# Patient Record
Sex: MALE | Race: WHITE | Employment: OTHER | URBAN - METROPOLITAN AREA
[De-identification: names, ages, dates, MRNs, and addresses within clinical notes are randomized per-mention and may not be internally consistent; named-entity substitution may affect disease eponyms.]

---

## 2022-11-08 ENCOUNTER — HOSPITAL ENCOUNTER (OUTPATIENT)
Age: 67
Setting detail: OUTPATIENT SURGERY
Discharge: HOME | End: 2022-11-08
Attending: OPHTHALMOLOGY | Admitting: OPHTHALMOLOGY
Payer: MEDICARE

## 2022-11-08 ENCOUNTER — ANESTHESIA (OUTPATIENT)
Dept: SURGERY | Age: 67
End: 2022-11-08
Payer: MEDICARE

## 2022-11-08 ENCOUNTER — PREP FOR PROCEDURE (OUTPATIENT)
Dept: ADMINISTRATIVE | Age: 67
End: 2022-11-08

## 2022-11-08 ENCOUNTER — ANESTHESIA EVENT (OUTPATIENT)
Dept: SURGERY | Age: 67
End: 2022-11-08
Payer: MEDICARE

## 2022-11-08 VITALS
TEMPERATURE: 97.2 F | RESPIRATION RATE: 18 BRPM | DIASTOLIC BLOOD PRESSURE: 59 MMHG | HEART RATE: 66 BPM | SYSTOLIC BLOOD PRESSURE: 109 MMHG | HEIGHT: 71 IN | OXYGEN SATURATION: 100 % | WEIGHT: 194 LBS | BODY MASS INDEX: 27.16 KG/M2

## 2022-11-08 PROBLEM — I48.91 A-FIB (HCC): Status: ACTIVE | Noted: 2022-11-08

## 2022-11-08 PROBLEM — N18.30 CKD (CHRONIC KIDNEY DISEASE), STAGE III (HCC): Status: ACTIVE | Noted: 2022-11-08

## 2022-11-08 PROBLEM — I10 HTN (HYPERTENSION): Status: ACTIVE | Noted: 2022-11-08

## 2022-11-08 PROBLEM — E11.9 DIABETES (HCC): Status: ACTIVE | Noted: 2022-11-08

## 2022-11-08 PROBLEM — I50.9 CHF (CONGESTIVE HEART FAILURE) (HCC): Status: ACTIVE | Noted: 2022-11-08

## 2022-11-08 LAB
GLUCOSE BLD STRIP.AUTO-MCNC: 100 MG/DL (ref 65–100)
POTASSIUM BLD-SCNC: 3.8 MMOL/L (ref 3.5–5.1)
SERVICE CMNT-IMP: NORMAL

## 2022-11-08 PROCEDURE — 2580000003 HC RX 258

## 2022-11-08 PROCEDURE — 6360000002 HC RX W HCPCS: Performed by: OPHTHALMOLOGY

## 2022-11-08 PROCEDURE — 7100000000 HC PACU RECOVERY - FIRST 15 MIN: Performed by: OPHTHALMOLOGY

## 2022-11-08 PROCEDURE — 7100000001 HC PACU RECOVERY - ADDTL 15 MIN: Performed by: OPHTHALMOLOGY

## 2022-11-08 PROCEDURE — 3600000014 HC SURGERY LEVEL 4 ADDTL 15MIN: Performed by: OPHTHALMOLOGY

## 2022-11-08 PROCEDURE — 3600000004 HC SURGERY LEVEL 4 BASE: Performed by: OPHTHALMOLOGY

## 2022-11-08 PROCEDURE — 2500000003 HC RX 250 WO HCPCS: Performed by: OPHTHALMOLOGY

## 2022-11-08 PROCEDURE — 2709999900 HC NON-CHARGEABLE SUPPLY: Performed by: OPHTHALMOLOGY

## 2022-11-08 PROCEDURE — 6370000000 HC RX 637 (ALT 250 FOR IP): Performed by: OPHTHALMOLOGY

## 2022-11-08 PROCEDURE — 82962 GLUCOSE BLOOD TEST: CPT

## 2022-11-08 PROCEDURE — 2720000010 HC SURG SUPPLY STERILE: Performed by: OPHTHALMOLOGY

## 2022-11-08 PROCEDURE — 6360000002 HC RX W HCPCS

## 2022-11-08 PROCEDURE — 7100000010 HC PHASE II RECOVERY - FIRST 15 MIN: Performed by: OPHTHALMOLOGY

## 2022-11-08 PROCEDURE — 3700000000 HC ANESTHESIA ATTENDED CARE: Performed by: OPHTHALMOLOGY

## 2022-11-08 PROCEDURE — 84132 ASSAY OF SERUM POTASSIUM: CPT

## 2022-11-08 PROCEDURE — 3700000001 HC ADD 15 MINUTES (ANESTHESIA): Performed by: OPHTHALMOLOGY

## 2022-11-08 RX ORDER — TRAMADOL HYDROCHLORIDE 50 MG/1
50 TABLET ORAL 3 TIMES DAILY
COMMUNITY

## 2022-11-08 RX ORDER — CYCLOPENTOLATE HYDROCHLORIDE 20 MG/ML
1 SOLUTION/ DROPS OPHTHALMIC
Status: CANCELLED | OUTPATIENT
Start: 2022-11-08 | End: 2022-11-08

## 2022-11-08 RX ORDER — GABAPENTIN 400 MG/1
400 CAPSULE ORAL 3 TIMES DAILY
COMMUNITY
Start: 2022-10-31 | End: 2023-04-29

## 2022-11-08 RX ORDER — BUPIVACAINE HYDROCHLORIDE 7.5 MG/ML
INJECTION, SOLUTION EPIDURAL; RETROBULBAR PRN
Status: DISCONTINUED | OUTPATIENT
Start: 2022-11-08 | End: 2022-11-08 | Stop reason: HOSPADM

## 2022-11-08 RX ORDER — DULAGLUTIDE 0.75 MG/.5ML
0.75 INJECTION, SOLUTION SUBCUTANEOUS WEEKLY
COMMUNITY
Start: 2022-10-08

## 2022-11-08 RX ORDER — BETAMETHASONE SODIUM PHOSPHATE AND BETAMETHASONE ACETATE 3; 3 MG/ML; MG/ML
INJECTION, SUSPENSION INTRA-ARTICULAR; INTRALESIONAL; INTRAMUSCULAR; SOFT TISSUE PRN
Status: DISCONTINUED | OUTPATIENT
Start: 2022-11-08 | End: 2022-11-08 | Stop reason: HOSPADM

## 2022-11-08 RX ORDER — CYCLOPENTOLATE HYDROCHLORIDE 20 MG/ML
1 SOLUTION/ DROPS OPHTHALMIC
Status: COMPLETED | OUTPATIENT
Start: 2022-11-08 | End: 2022-11-08

## 2022-11-08 RX ORDER — TROPICAMIDE 10 MG/ML
1 SOLUTION/ DROPS OPHTHALMIC
Status: COMPLETED | OUTPATIENT
Start: 2022-11-08 | End: 2022-11-08

## 2022-11-08 RX ORDER — TORSEMIDE 20 MG/1
20 TABLET ORAL DAILY
COMMUNITY
Start: 2022-05-27

## 2022-11-08 RX ORDER — SODIUM CHLORIDE, POTASSIUM CHLORIDE, CALCIUM CHLORIDE, MAGNESIUM CHLORIDE, SODIUM ACETATE, AND SODIUM CITRATE 6.4; .75; .48; .3; 3.9; 1.7 MG/ML; MG/ML; MG/ML; MG/ML; MG/ML; MG/ML
SOLUTION IRRIGATION PRN
Status: DISCONTINUED | OUTPATIENT
Start: 2022-11-08 | End: 2022-11-08 | Stop reason: HOSPADM

## 2022-11-08 RX ORDER — SPIRONOLACTONE 25 MG/1
25 TABLET ORAL DAILY
COMMUNITY
Start: 2022-08-29 | End: 2023-08-29

## 2022-11-08 RX ORDER — ACETAMINOPHEN 325 MG/1
650 TABLET ORAL EVERY 6 HOURS PRN
COMMUNITY
Start: 2022-10-08

## 2022-11-08 RX ORDER — PHENYLEPHRINE HCL 2.5 %
1 DROPS OPHTHALMIC (EYE)
Status: CANCELLED | OUTPATIENT
Start: 2022-11-08 | End: 2022-11-08

## 2022-11-08 RX ORDER — ASPIRIN 81 MG/1
81 TABLET ORAL DAILY
COMMUNITY
Start: 2022-04-26 | End: 2023-10-10

## 2022-11-08 RX ORDER — PREDNISOLONE ACETATE 10 MG/ML
SUSPENSION/ DROPS OPHTHALMIC
COMMUNITY
Start: 2022-11-08

## 2022-11-08 RX ORDER — SODIUM CHLORIDE, SODIUM LACTATE, POTASSIUM CHLORIDE, CALCIUM CHLORIDE 600; 310; 30; 20 MG/100ML; MG/100ML; MG/100ML; MG/100ML
INJECTION, SOLUTION INTRAVENOUS CONTINUOUS PRN
Status: DISCONTINUED | OUTPATIENT
Start: 2022-11-08 | End: 2022-11-08 | Stop reason: SDUPTHER

## 2022-11-08 RX ORDER — ESCITALOPRAM OXALATE 10 MG/1
10 TABLET ORAL DAILY
COMMUNITY

## 2022-11-08 RX ORDER — CEFAZOLIN SODIUM 1 G/3ML
INJECTION, POWDER, FOR SOLUTION INTRAMUSCULAR; INTRAVENOUS PRN
Status: DISCONTINUED | OUTPATIENT
Start: 2022-11-08 | End: 2022-11-08 | Stop reason: HOSPADM

## 2022-11-08 RX ORDER — TETRACAINE HYDROCHLORIDE 5 MG/ML
SOLUTION OPHTHALMIC PRN
Status: DISCONTINUED | OUTPATIENT
Start: 2022-11-08 | End: 2022-11-08 | Stop reason: HOSPADM

## 2022-11-08 RX ORDER — CARISOPRODOL 350 MG/1
350 TABLET ORAL
COMMUNITY

## 2022-11-08 RX ORDER — TROPICAMIDE 10 MG/ML
1 SOLUTION/ DROPS OPHTHALMIC
Status: CANCELLED | OUTPATIENT
Start: 2022-11-08 | End: 2022-11-08

## 2022-11-08 RX ORDER — TRAZODONE HYDROCHLORIDE 100 MG/1
100 TABLET ORAL
COMMUNITY

## 2022-11-08 RX ORDER — MIDAZOLAM HYDROCHLORIDE 1 MG/ML
INJECTION INTRAMUSCULAR; INTRAVENOUS PRN
Status: DISCONTINUED | OUTPATIENT
Start: 2022-11-08 | End: 2022-11-08 | Stop reason: SDUPTHER

## 2022-11-08 RX ORDER — NEOMYCIN SULFATE, POLYMYXIN B SULFATE, AND DEXAMETHASONE 3.5; 10000; 1 MG/G; [USP'U]/G; MG/G
OINTMENT OPHTHALMIC PRN
Status: DISCONTINUED | OUTPATIENT
Start: 2022-11-08 | End: 2022-11-08 | Stop reason: HOSPADM

## 2022-11-08 RX ORDER — METOPROLOL SUCCINATE 100 MG/1
100 TABLET, EXTENDED RELEASE ORAL NIGHTLY
COMMUNITY
Start: 2022-05-27 | End: 2023-05-22

## 2022-11-08 RX ORDER — ATORVASTATIN CALCIUM 40 MG/1
40 TABLET, FILM COATED ORAL DAILY
COMMUNITY
Start: 2022-05-27 | End: 2023-05-22

## 2022-11-08 RX ORDER — OFLOXACIN 3 MG/ML
SOLUTION/ DROPS OPHTHALMIC
COMMUNITY
Start: 2022-11-08

## 2022-11-08 RX ORDER — AMIODARONE HYDROCHLORIDE 200 MG/1
200 TABLET ORAL DAILY
COMMUNITY
Start: 2022-10-28

## 2022-11-08 RX ORDER — LIDOCAINE HYDROCHLORIDE 20 MG/ML
INJECTION, SOLUTION EPIDURAL; INFILTRATION; INTRACAUDAL; PERINEURAL PRN
Status: DISCONTINUED | OUTPATIENT
Start: 2022-11-08 | End: 2022-11-08 | Stop reason: HOSPADM

## 2022-11-08 RX ORDER — PHENYLEPHRINE HCL 2.5 %
1 DROPS OPHTHALMIC (EYE)
Status: COMPLETED | OUTPATIENT
Start: 2022-11-08 | End: 2022-11-08

## 2022-11-08 RX ADMIN — TROPICAMIDE 1 DROP: 10 SOLUTION/ DROPS OPHTHALMIC at 12:25

## 2022-11-08 RX ADMIN — TROPICAMIDE 1 DROP: 10 SOLUTION/ DROPS OPHTHALMIC at 12:35

## 2022-11-08 RX ADMIN — PHENYLEPHRINE HYDROCHLORIDE 1 DROP: 25 SOLUTION/ DROPS OPHTHALMIC at 12:35

## 2022-11-08 RX ADMIN — TROPICAMIDE 1 DROP: 10 SOLUTION/ DROPS OPHTHALMIC at 12:30

## 2022-11-08 RX ADMIN — CYCLOPENTOLATE HYDROCHLORIDE 1 DROP: 20 SOLUTION/ DROPS OPHTHALMIC at 12:30

## 2022-11-08 RX ADMIN — PHENYLEPHRINE HYDROCHLORIDE 1 DROP: 25 SOLUTION/ DROPS OPHTHALMIC at 12:25

## 2022-11-08 RX ADMIN — MIDAZOLAM 1 MG: 1 INJECTION INTRAMUSCULAR; INTRAVENOUS at 14:12

## 2022-11-08 RX ADMIN — SODIUM CHLORIDE, SODIUM LACTATE, POTASSIUM CHLORIDE, AND CALCIUM CHLORIDE: 600; 310; 30; 20 INJECTION, SOLUTION INTRAVENOUS at 14:00

## 2022-11-08 RX ADMIN — CYCLOPENTOLATE HYDROCHLORIDE 1 DROP: 20 SOLUTION/ DROPS OPHTHALMIC at 12:25

## 2022-11-08 RX ADMIN — CYCLOPENTOLATE HYDROCHLORIDE 1 DROP: 20 SOLUTION/ DROPS OPHTHALMIC at 12:35

## 2022-11-08 RX ADMIN — MIDAZOLAM 1 MG: 1 INJECTION INTRAMUSCULAR; INTRAVENOUS at 14:07

## 2022-11-08 RX ADMIN — FENTANYL CITRATE 50 MCG: 50 INJECTION INTRAMUSCULAR; INTRAVENOUS at 14:08

## 2022-11-08 RX ADMIN — PHENYLEPHRINE HYDROCHLORIDE 1 DROP: 25 SOLUTION/ DROPS OPHTHALMIC at 12:30

## 2022-11-08 NOTE — DISCHARGE INSTRUCTIONS
FACE DOWN WHILE AWAKE X 24 HOURS, DO NOT LAY ON YOUR BACK. AFTER 24 HOURS, LEFT-SIDE DOWN FOR 2 DAYS. ACTIVITY   As tolerated and as directed by your doctor. You may shower in 24 hours. Do not take a bath until cleared by MD.     DIET   Clear liquids until no nausea or vomiting, then light diet for the first day. Advance to regular diet on second day, unless your doctor orders otherwise. If nausea and vomiting continues, call your doctor. PAIN   Take pain medication as directed by your doctor. Call your doctor if pain is NOT relieved by medication. CALL YOUR DOCTOR IF   Excessive bleeding that does not stop after holding pressure over the area. Temperature of 101 degrees F or above. Excessive redness, swelling or bruising, and/or green or yellow, smelly discharge from incision. After general anesthesia or intravenous sedation, for 24 hours or while taking prescription Narcotics:   Limit your activities   A responsible adult needs to be with you for the next 24 hours   Do not drive and operate hazardous machinery   Do not make important personal or business decisions   Do not drink alcoholic beverages     If you have not urinated within 8 hours after discharge, and you are experiencing discomfort from urinary retention, please go to the nearest ED. If you have sleep apnea and have a CPAP machine, please use it for all naps and sleeping. Please use caution when taking narcotics and any of your home medications that may cause drowsiness. * Please give a list of your current medications to your Primary Care Provider. * Please update this list whenever your medications are discontinued, doses are   changed, or new medications (including over-the-counter products) are added. * Please carry medication information at all times in case of emergency situations.      These are general instructions for a healthy lifestyle:   No smoking/ No tobacco products/ Avoid exposure to second hand smoke   Surgeon General's Warning: Quitting smoking now greatly reduces serious risk to your health. Obesity, smoking, and sedentary lifestyle greatly increases your risk for illness   A healthy diet, regular physical exercise & weight monitoring are important for maintaining a healthy lifestyle   You may be retaining fluid if you have a history of heart failure or if you experience any of the following symptoms: Weight gain of 3 pounds or more overnight or 5 pounds in a week, increased swelling in our hands or feet or shortness of breath while lying flat in bed. Please call your doctor as soon as you notice any of these symptoms; do not wait until your next office visit.

## 2022-11-08 NOTE — OP NOTE
FULL OPERATIVE NOTE         Date Time: 11/08/22 @ 3:20 PM     Patient Name: Alex Kemp     Attending Physician: Mandeep Diaz MD     Date of Operation:     11/08/22     Providers Performing:     Surgeon: Mandeep Diaz MD     Assistant: N/A     Operative Procedure:     Procedure(s):  EYE VITRECTOMY 25 GAUGE/ LASER/ GAS FLUID EXCHANGE/RIGHT     Preoperative Diagnosis:     Rhegmatogenous retinal detachment with multiple break(s), right eye       Postoperative Diagnosis:     Rhegmatogenous retinal detachment with multiple break(s), right eye       Indications:     Loss of vision           Operative Notes:       Informed consent was obtained prior the procedure. The procedure, risks, benefits, and alternatives were discussed with the patient. In the preoperative holding area the operative eye was marked and confirmed with the patient. The patient was then brought back to the operating room and a surgical safety timeout was performed. The patient was then placed under monitored anesthesia care. The operative eye was then prepped and draped in the usual sterile fashion for ophthalmic surgery. The microscope was brought into position and the lid speculum was placed. Topical betadine was instilled on the conjunctiva. The patient  received a Sub-Tenon's block consisting of .75% Marcaine and 2% xylocaine through an inferonasal cutdown incision. Standard 25 gauge instrumentation was used to create sclerotomies 3.5 mm posterior to the limbus. The infusion cannula was verified prior to turning on the infusion. Upon visualization with the light pipe, the retinal detachment extended from 7 to 10 with the macula detached. Two small retinal tears were noted at 9 o'clock. Scattered lattice degeneration was noted throughout the periphery. A core vitrectomy was performed. A posterior vitreous detachment was created without complication. Triesence was instilled to better visualize the vitreous base.  Vitrectomy was carried out 360 degrees, shaving the vitreous base with scleral depression. The retina was inspected 360-degrees and no additional retinal tears or breaks were noted. Diathermy was applied to all breaks and used create a retinotomy superotemporally. An air fluid exchange was then performed draining fluid through the retinotomy. Endolaser was applied to all breaks and around the retinotomy after fluid air exchange. A gas timeout was performed, confirming the appropriate type and concentration of gas. The eye was then flushed with 40 cc of 20% SF6 gas to a moderate tactile tension. The cannulas were pulled and the wounds were sutured with 6-0 plain-gut suture, and pressure was moderate to tactile tension. A subconjunctival injection of ancef and dexamethasone was given. At the end of case the IOP was physiologic and < 15 mmHg, the nerve was perfused, and the retina was attached 360 degrees. The eye was dressed with  cyclogyl and erythromycin ointment. A patch was placed over the eye. The patient tolerated the procedure well. There were no complications. The patient received post operative instructions including follow up with [facility].          Estimated Blood Loss:     Less than 1 cc     Specimens:     None       Complications:     None       Signed by: Vitaliy Gray MD     11/08/22 3:20 PM

## 2022-11-08 NOTE — ANESTHESIA PRE PROCEDURE
Department of Anesthesiology  Preprocedure Note       Name:  Janel Desai   Age:  74 y.o.  :  1955                                          MRN:  [id number]         Date:  2022      Surgeon: Zack Betancourt):  Arnie Ruiz MD    Procedure: Procedure(s):  EYE VITRECTOMY 25 GAUGE/ LASER/ GAS FLUID EXCHANGE/RIGHT    Medications prior to admission:   Prior to Admission medications    Medication Sig Start Date End Date Taking? Authorizing Provider   acetaminophen (TYLENOL) 325 MG tablet Take 650 mg by mouth every 6 hours as needed 10/8/22  Yes Historical Provider, MD   amiodarone (CORDARONE) 200 MG tablet Take 200 mg by mouth daily 10/28/22  Yes Historical Provider, MD   apixaban (ELIQUIS) 5 MG TABS tablet Take 5 mg by mouth 2 times daily 22 Yes Historical Provider, MD   aspirin 81 MG EC tablet Take 81 mg by mouth daily 4/26/22 10/10/23 Yes Historical Provider, MD   atorvastatin (LIPITOR) 40 MG tablet Take 40 mg by mouth daily 22 Yes Historical Provider, MD   Dulaglutide (TRULICITY) 1.22 YC/9.8VY SOPN Inject 0.75 mg into the skin once a week 10/8/22  Yes Historical Provider, MD   empagliflozin (JARDIANCE) 10 MG tablet Take 10 mg by mouth daily 22  Yes Historical Provider, MD   gabapentin (NEURONTIN) 400 MG capsule Take 400 mg by mouth 3 times daily. 10/31/22 4/29/23 Yes Historical Provider, MD   metoprolol succinate (TOPROL XL) 100 MG extended release tablet Take 100 mg by mouth daily 22 Yes Historical Provider, MD   sacubitril-valsartan (ENTRESTO) 24-26 MG per tablet Take 1 tablet by mouth 2 times daily 22  Yes Historical Provider, MD   spironolactone (ALDACTONE) 25 MG tablet Take 25 mg by mouth daily 22 Yes Historical Provider, MD   torsemide (DEMADEX) 20 MG tablet Take 20 mg by mouth daily 22  Yes Historical Provider, MD   carisoprodol (SOMA) 350 MG tablet Take 350 mg by mouth.     Historical Provider, MD   escitalopram (LEXAPRO) 10 MG tablet Take 10 mg by mouth daily    Historical Provider, MD   ofloxacin (OCUFLOX) 0.3 % solution  11/8/22   Historical Provider, MD   prednisoLONE acetate (PRED FORTE) 1 % ophthalmic suspension  11/8/22   Historical Provider, MD   traMADol (ULTRAM) 50 MG tablet Take 50 mg by mouth 3 times daily. Historical Provider, MD   traZODone (DESYREL) 100 MG tablet Take 100 mg by mouth    Historical Provider, MD       Current medications:    No current facility-administered medications for this encounter. Allergies: Allergies   Allergen Reactions    Codeine Hives and Shortness Of Breath       Problem List:  There is no problem list on file for this patient. Past Medical History:  No past medical history on file. Past Surgical History:  No past surgical history on file. Social History:    Social History     Tobacco Use    Smoking status: Not on file    Smokeless tobacco: Not on file   Substance Use Topics    Alcohol use: Not on file                                Counseling given: Not Answered      Vital Signs (Current): There were no vitals filed for this visit. BP Readings from Last 3 Encounters:   No data found for BP       NPO Status:                                                                                 BMI:   Wt Readings from Last 3 Encounters:   No data found for Wt     There is no height or weight on file to calculate BMI.    CBC: No results found for: WBC, RBC, HGB, HCT, MCV, RDW, PLT    CMP: No results found for: NA, K, CL, CO2, BUN, CREATININE, GFRAA, AGRATIO, LABGLOM, GLUCOSE, GLU, PROT, CALCIUM, BILITOT, ALKPHOS, AST, ALT    POC Tests:   Recent Labs     11/08/22  1222   POCGLU 100   POCK 3.8       Coags: No results found for: PROTIME, INR, APTT    HCG (If Applicable): No results found for: PREGTESTUR, PREGSERUM, HCG, HCGQUANT     ABGs: No results found for: PHART, PO2ART, DIM7GMB, ZPF4LDL, BEART, F1WTFLYC     Type & Screen (If Applicable):   No results found for: Tabby Falling    Drug/Infectious Status (If Applicable):  No results found for: HIV, HEPCAB    COVID-19 Screening (If Applicable): No results found for: COVID19        Anesthesia Evaluation  Patient summary reviewed and Nursing notes reviewed  Airway: Mallampati: II          Dental:    (+) lower dentures and upper dentures      Pulmonary:normal exam  breath sounds clear to auscultation                             Cardiovascular:  Exercise tolerance: poor (<4 METS),   (+) hypertension:, dysrhythmias: atrial fibrillation, CHF: systolic, hyperlipidemia        Rhythm: irregular  Rate: normal                    Neuro/Psych:               GI/Hepatic/Renal: Neg GI/Hepatic/Renal ROS            Endo/Other: Negative Endo/Other ROS                    Abdominal:             Vascular: Other Findings: 73 yo F with sig cardiac history including numerous episodes of Afib RVR resulting in syncope, recent placement of LifeVest with anticipation of Afib ablation and AICD placement for urgent/emergent retinal surgery for detachment          Anesthesia Plan      MAC     ASA 4     (Discussed with pt the increased risk that he has given his underlying heart condition. But given the emergent nature of this surgery in order to not lose vision, pt agreeable to accept the increased risk given his comorbidities.)  Induction: intravenous. Anesthetic plan and risks discussed with patient.                         Yissel Parish DO   11/8/2022

## 2023-12-05 ENCOUNTER — HOSPITAL ENCOUNTER (OUTPATIENT)
Dept: GENERAL RADIOLOGY | Age: 68
Discharge: HOME | End: 2023-12-08
Payer: MEDICARE

## 2023-12-05 DIAGNOSIS — M13.0 POLYARTHRITIS: ICD-10-CM

## 2023-12-05 DIAGNOSIS — R06.09 DOE (DYSPNEA ON EXERTION): ICD-10-CM

## 2023-12-05 PROCEDURE — 73120 X-RAY EXAM OF HAND: CPT

## 2023-12-05 PROCEDURE — 71046 X-RAY EXAM CHEST 2 VIEWS: CPT

## 2025-05-30 ENCOUNTER — OFFICE VISIT (OUTPATIENT)
Dept: PRIMARY CARE CLINIC | Facility: CLINIC | Age: 70
End: 2025-05-30

## 2025-05-30 VITALS
OXYGEN SATURATION: 98 % | SYSTOLIC BLOOD PRESSURE: 108 MMHG | DIASTOLIC BLOOD PRESSURE: 64 MMHG | WEIGHT: 208.4 LBS | HEART RATE: 87 BPM | HEIGHT: 71 IN | BODY MASS INDEX: 29.18 KG/M2

## 2025-05-30 DIAGNOSIS — I10 PRIMARY HYPERTENSION: ICD-10-CM

## 2025-05-30 DIAGNOSIS — N18.32 STAGE 3B CHRONIC KIDNEY DISEASE (CKD) (HCC): Chronic | ICD-10-CM

## 2025-05-30 DIAGNOSIS — I42.9 CARDIOMYOPATHY, UNSPECIFIED TYPE (HCC): ICD-10-CM

## 2025-05-30 DIAGNOSIS — E78.2 MIXED HYPERLIPIDEMIA: ICD-10-CM

## 2025-05-30 DIAGNOSIS — Z79.4 TYPE 2 DIABETES MELLITUS WITH DIABETIC POLYNEUROPATHY, WITH LONG-TERM CURRENT USE OF INSULIN (HCC): ICD-10-CM

## 2025-05-30 DIAGNOSIS — I48.11 LONGSTANDING PERSISTENT ATRIAL FIBRILLATION (HCC): ICD-10-CM

## 2025-05-30 DIAGNOSIS — I50.22 CHRONIC HEART FAILURE WITH REDUCED EJECTION FRACTION (HFREF, <= 40%) (HCC): ICD-10-CM

## 2025-05-30 DIAGNOSIS — Z91.81 AT HIGH RISK FOR FALLS: ICD-10-CM

## 2025-05-30 DIAGNOSIS — Z00.00 MEDICARE ANNUAL WELLNESS VISIT, SUBSEQUENT: Primary | ICD-10-CM

## 2025-05-30 DIAGNOSIS — E11.65 TYPE 2 DIABETES MELLITUS WITH HYPERGLYCEMIA, WITHOUT LONG-TERM CURRENT USE OF INSULIN (HCC): ICD-10-CM

## 2025-05-30 DIAGNOSIS — E11.42 TYPE 2 DIABETES MELLITUS WITH DIABETIC POLYNEUROPATHY, WITH LONG-TERM CURRENT USE OF INSULIN (HCC): ICD-10-CM

## 2025-05-30 DIAGNOSIS — F51.01 PRIMARY INSOMNIA: ICD-10-CM

## 2025-05-30 DIAGNOSIS — R97.20 ELEVATED PSA: ICD-10-CM

## 2025-05-30 PROBLEM — I50.9 CONGESTIVE HEART FAILURE (HCC): Status: ACTIVE | Noted: 2022-03-10

## 2025-05-30 PROBLEM — N18.30 CKD (CHRONIC KIDNEY DISEASE), STAGE III (HCC): Status: RESOLVED | Noted: 2022-11-08 | Resolved: 2025-05-30

## 2025-05-30 PROBLEM — I50.9 CONGESTIVE HEART FAILURE (HCC): Status: RESOLVED | Noted: 2022-03-10 | Resolved: 2025-05-30

## 2025-05-30 PROBLEM — M10.9 ACUTE GOUT: Status: ACTIVE | Noted: 2022-11-17

## 2025-05-30 PROBLEM — I50.21 ACUTE HFREF (HEART FAILURE WITH REDUCED EJECTION FRACTION) (HCC): Chronic | Status: ACTIVE | Noted: 2022-04-22

## 2025-05-30 PROBLEM — I48.91 ATRIAL FIBRILLATION (HCC): Status: ACTIVE | Noted: 2022-03-10

## 2025-05-30 PROBLEM — E11.40 NEUROPATHY DUE TO TYPE 2 DIABETES MELLITUS (HCC): Status: ACTIVE | Noted: 2024-03-14

## 2025-05-30 PROBLEM — M54.16 LUMBAR RADICULOPATHY: Status: ACTIVE | Noted: 2022-06-29

## 2025-05-30 PROBLEM — D68.69 OTHER THROMBOPHILIA: Status: ACTIVE | Noted: 2024-09-12

## 2025-05-30 PROBLEM — Z95.810 ICD (IMPLANTABLE CARDIOVERTER-DEFIBRILLATOR) IN PLACE: Status: ACTIVE | Noted: 2023-10-31

## 2025-05-30 PROBLEM — E11.9 DIABETES (HCC): Status: RESOLVED | Noted: 2022-11-08 | Resolved: 2025-05-30

## 2025-05-30 PROBLEM — I25.10 CORONARY ARTERY DISEASE INVOLVING NATIVE CORONARY ARTERY OF NATIVE HEART WITHOUT ANGINA PECTORIS: Chronic | Status: ACTIVE | Noted: 2022-05-27

## 2025-05-30 LAB
ALBUMIN SERPL-MCNC: 3.7 G/DL (ref 3.2–4.6)
ALBUMIN/GLOB SERPL: 1.1 (ref 1–1.9)
ALP SERPL-CCNC: 74 U/L (ref 40–129)
ALT SERPL-CCNC: 29 U/L (ref 8–55)
ANION GAP SERPL CALC-SCNC: 14 MMOL/L (ref 7–16)
APPEARANCE UR: CLEAR
AST SERPL-CCNC: 32 U/L (ref 15–37)
BACTERIA URNS QL MICRO: NEGATIVE /HPF
BASOPHILS # BLD: 0.11 K/UL (ref 0–0.2)
BASOPHILS NFR BLD: 1.2 % (ref 0–2)
BILIRUB SERPL-MCNC: 0.9 MG/DL (ref 0–1.2)
BILIRUB UR QL: NEGATIVE
BUN SERPL-MCNC: 21 MG/DL (ref 8–23)
CALCIUM SERPL-MCNC: 10.7 MG/DL (ref 8.8–10.2)
CASTS URNS QL MICRO: 0 /LPF
CHLORIDE SERPL-SCNC: 102 MMOL/L (ref 98–107)
CHOLEST SERPL-MCNC: 153 MG/DL (ref 0–200)
CO2 SERPL-SCNC: 28 MMOL/L (ref 20–29)
COLOR UR: ABNORMAL
CREAT SERPL-MCNC: 1.63 MG/DL (ref 0.8–1.3)
CRYSTALS URNS QL MICRO: 0 /LPF
DIFFERENTIAL METHOD BLD: ABNORMAL
EOSINOPHIL # BLD: 0.14 K/UL (ref 0–0.8)
EOSINOPHIL NFR BLD: 1.5 % (ref 0.5–7.8)
EPI CELLS #/AREA URNS HPF: ABNORMAL /HPF (ref 0–5)
ERYTHROCYTE [DISTWIDTH] IN BLOOD BY AUTOMATED COUNT: 14.6 % (ref 11.9–14.6)
EST. AVERAGE GLUCOSE BLD GHB EST-MCNC: 201 MG/DL
GLOBULIN SER CALC-MCNC: 3.4 G/DL (ref 2.3–3.5)
GLUCOSE SERPL-MCNC: 184 MG/DL (ref 70–99)
GLUCOSE UR STRIP.AUTO-MCNC: >1000 MG/DL
HBA1C MFR BLD: 8.6 % (ref 0–5.6)
HCT VFR BLD AUTO: 57.6 % (ref 41.1–50.3)
HDLC SERPL-MCNC: 43 MG/DL (ref 40–60)
HDLC SERPL: 3.5 (ref 0–5)
HGB BLD-MCNC: 18.7 G/DL (ref 13.6–17.2)
HGB UR QL STRIP: NEGATIVE
HYALINE CASTS URNS QL MICRO: ABNORMAL /LPF
IMM GRANULOCYTES # BLD AUTO: 0.21 K/UL (ref 0–0.5)
IMM GRANULOCYTES NFR BLD AUTO: 2.3 % (ref 0–5)
KETONES UR QL STRIP.AUTO: NEGATIVE MG/DL
LDLC SERPL CALC-MCNC: 79 MG/DL (ref 0–100)
LEUKOCYTE ESTERASE UR QL STRIP.AUTO: NEGATIVE
LYMPHOCYTES # BLD: 2.38 K/UL (ref 0.5–4.6)
LYMPHOCYTES NFR BLD: 26 % (ref 13–44)
MCH RBC QN AUTO: 33.6 PG (ref 26.1–32.9)
MCHC RBC AUTO-ENTMCNC: 32.5 G/DL (ref 31.4–35)
MCV RBC AUTO: 103.6 FL (ref 82–102)
MONOCYTES # BLD: 0.68 K/UL (ref 0.1–1.3)
MONOCYTES NFR BLD: 7.4 % (ref 4–12)
MUCOUS THREADS URNS QL MICRO: 0 /LPF
NEUTS SEG # BLD: 5.65 K/UL (ref 1.7–8.2)
NEUTS SEG NFR BLD: 61.6 % (ref 43–78)
NITRITE UR QL STRIP.AUTO: NEGATIVE
NRBC # BLD: 0 K/UL (ref 0–0.2)
PH UR STRIP: 5 (ref 5–9)
PLATELET # BLD AUTO: 168 K/UL (ref 150–450)
PMV BLD AUTO: 11.3 FL (ref 9.4–12.3)
POTASSIUM SERPL-SCNC: 5 MMOL/L (ref 3.5–5.1)
PROT SERPL-MCNC: 7.1 G/DL (ref 6.3–8.2)
PROT UR STRIP-MCNC: NEGATIVE MG/DL
PSA SERPL-MCNC: 11.6 NG/ML (ref 0–4)
RBC # BLD AUTO: 5.56 M/UL (ref 4.23–5.6)
RBC #/AREA URNS HPF: ABNORMAL /HPF (ref 0–5)
SODIUM SERPL-SCNC: 143 MMOL/L (ref 136–145)
SP GR UR REFRACTOMETRY: 1.02 (ref 1–1.02)
TRIGL SERPL-MCNC: 157 MG/DL (ref 0–150)
TSH W FREE THYROID IF ABNORMAL: 1.69 UIU/ML (ref 0.27–4.2)
URINE CULTURE IF INDICATED: ABNORMAL
UROBILINOGEN UR QL STRIP.AUTO: 0.2 EU/DL (ref 0.2–1)
VLDLC SERPL CALC-MCNC: 31 MG/DL (ref 6–23)
WBC # BLD AUTO: 9.2 K/UL (ref 4.3–11.1)
WBC URNS QL MICRO: ABNORMAL /HPF (ref 0–4)

## 2025-05-30 RX ORDER — TRAZODONE HYDROCHLORIDE 150 MG/1
150 TABLET ORAL NIGHTLY
Qty: 90 TABLET | Refills: 3 | Status: SHIPPED | OUTPATIENT
Start: 2025-05-30 | End: 2026-05-30

## 2025-05-30 RX ORDER — PREDNISONE 5 MG/1
5 TABLET ORAL
COMMUNITY

## 2025-05-30 RX ORDER — FUROSEMIDE 20 MG/1
20 TABLET ORAL DAILY
COMMUNITY
Start: 2025-03-14 | End: 2025-06-02 | Stop reason: ALTCHOICE

## 2025-05-30 SDOH — ECONOMIC STABILITY: FOOD INSECURITY: WITHIN THE PAST 12 MONTHS, THE FOOD YOU BOUGHT JUST DIDN'T LAST AND YOU DIDN'T HAVE MONEY TO GET MORE.: PATIENT DECLINED

## 2025-05-30 SDOH — HEALTH STABILITY: PHYSICAL HEALTH: ON AVERAGE, HOW MANY MINUTES DO YOU ENGAGE IN EXERCISE AT THIS LEVEL?: 40 MIN

## 2025-05-30 SDOH — HEALTH STABILITY: PHYSICAL HEALTH: ON AVERAGE, HOW MANY DAYS PER WEEK DO YOU ENGAGE IN MODERATE TO STRENUOUS EXERCISE (LIKE A BRISK WALK)?: 3 DAYS

## 2025-05-30 SDOH — ECONOMIC STABILITY: FOOD INSECURITY: WITHIN THE PAST 12 MONTHS, YOU WORRIED THAT YOUR FOOD WOULD RUN OUT BEFORE YOU GOT MONEY TO BUY MORE.: PATIENT DECLINED

## 2025-05-30 ASSESSMENT — PATIENT HEALTH QUESTIONNAIRE - PHQ9
SUM OF ALL RESPONSES TO PHQ QUESTIONS 1-9: 0
2. FEELING DOWN, DEPRESSED OR HOPELESS: NOT AT ALL
1. LITTLE INTEREST OR PLEASURE IN DOING THINGS: NOT AT ALL
SUM OF ALL RESPONSES TO PHQ QUESTIONS 1-9: 0

## 2025-05-30 ASSESSMENT — LIFESTYLE VARIABLES
HOW OFTEN DO YOU HAVE A DRINK CONTAINING ALCOHOL: NEVER
HOW MANY STANDARD DRINKS CONTAINING ALCOHOL DO YOU HAVE ON A TYPICAL DAY: PATIENT DOES NOT DRINK

## 2025-05-30 NOTE — PROGRESS NOTES
[facility]  Elissa Hart, MSN, APRN, FNP-c  [address] [address]  [phone]    New patient establishment and CHRONIC CARE MANAGEMENT     Date of Service: 06/02/25  Primary Care Provider: Elissa Hart APRN - CNP    PROVIDER NOTE    Paddy Russ is a 70 y.o. male who presents for the following issues:  Chief Complaint   Patient presents with    Establish Care Medicare AW         Subjective       70-year-old male presents today to establish with our practice.  He was a former patient of mine in another healthcare system and he transferred to internal medicine but he presents today to reestablish with our practice in this healthcare system.  This is my first time seeing him in this healthcare system.    His Medicare wellness visit is due today.  We will complete this in office.    He is followed by multiple specialist and has several chronic conditions and these would include:     Atrial fibrillation (HCC)  Takes Eliquis 5 mg twice daily  Remains on chronic anticoagulation therapy.  Follows with cardiology for this condition.  No recent exacerbations or episodes.    HTN (hypertension)  Blood pressure today in office: 108/64 with a pulse of 87.  Currently taking: Amiodarone 200 mg daily, aspirin 81 mg daily, metoprolol 100 mg nightly, spironolactone 25 mg daily, Entresto 24-26 mg twice daily  Overall stable.  Follows with [facility].    Chronic gout  Not currently on medication for treatment of this condition.  Overall stable.  Does see rheumatology for flareups.  No recent flareups to report.    Chronic heart failure with reduced ejection fraction (HFrEF, <= 40%) (HCC)/Cardiomyopathy (HCC)  Follows with cardiology for this condition.  Taking torsemide 20 mg daily, Entresto daily, Jardiance daily, metoprolol, amiodarone.  No recent exacerbations requiring hospitalization.  Reports no shortness of breath or peripheral edema at this time.

## 2025-06-02 ENCOUNTER — RESULTS FOLLOW-UP (OUTPATIENT)
Dept: PRIMARY CARE CLINIC | Facility: CLINIC | Age: 70
End: 2025-06-02

## 2025-06-02 DIAGNOSIS — R97.20 ELEVATED PSA, BETWEEN 10 AND LESS THAN 20 NG/ML: Primary | ICD-10-CM

## 2025-06-02 PROBLEM — I48.11 LONGSTANDING PERSISTENT ATRIAL FIBRILLATION (HCC): Chronic | Status: RESOLVED | Noted: 2022-04-22 | Resolved: 2025-06-02

## 2025-06-02 PROBLEM — E11.40 NEUROPATHY DUE TO TYPE 2 DIABETES MELLITUS (HCC): Status: RESOLVED | Noted: 2024-03-14 | Resolved: 2025-06-02

## 2025-06-02 PROBLEM — E78.2 MIXED HYPERLIPIDEMIA: Status: ACTIVE | Noted: 2025-06-02

## 2025-06-02 RX ORDER — ASPIRIN 81 MG/1
81 TABLET ORAL DAILY
Qty: 90 TABLET | Refills: 3 | Status: SHIPPED | OUTPATIENT
Start: 2025-06-02 | End: 2026-06-02

## 2025-06-02 RX ORDER — ATORVASTATIN CALCIUM 40 MG/1
40 TABLET, FILM COATED ORAL DAILY
Qty: 90 TABLET | Refills: 3 | Status: SHIPPED | OUTPATIENT
Start: 2025-06-02 | End: 2026-06-02

## 2025-06-02 RX ORDER — GABAPENTIN 400 MG/1
400 CAPSULE ORAL 3 TIMES DAILY
Qty: 270 CAPSULE | Refills: 3 | Status: SHIPPED | OUTPATIENT
Start: 2025-06-02 | End: 2026-06-02

## 2025-06-02 NOTE — ASSESSMENT & PLAN NOTE
Monitored by specialist- no acute findings meriting change in the plan   Also, see portal message.  Pt sent her response through portal.    Thanks

## 2025-06-02 NOTE — TELEPHONE ENCOUNTER
----- Message from SARAH Dickey - CNP sent at 6/2/2025  6:26 AM EDT -----  Please reach out to Paddy and let him know that we have his lab results.  His A1c is unfortunately at 8.6.  We may have to get him back on an injectable therapy but we can also try the oral version which might be cheaper would he like to start on Rybelsus?  It is the oral version of his Trulicity that he was on previously.  Medicare does cover this.    I definitely want him to increase his Jardiance to 25 mg as we discussed in office.    His kidney function is decreased and I need to keep a close eye on this.  I would strongly recommend that he increase his hydration.  I we will recheck this in 3 months if it is not improving I will need to send him to the kidney doctors.    As I was concerned his PSA is at 11.6.  I am going to order an MRI of his prostate as we discussed.    Please follow-up in 3 months and let me know about the Rybelsus.  We do have a 30-day sample we can start him on if needed.    Thank you, GEOFFREY

## 2025-06-02 NOTE — RESULT ENCOUNTER NOTE
Please reach out to Paddy and let him know that we have his lab results.  His A1c is unfortunately at 8.6.  We may have to get him back on an injectable therapy but we can also try the oral version which might be cheaper would he like to start on Rybelsus?  It is the oral version of his Trulicity that he was on previously.  Medicare does cover this.    I definitely want him to increase his Jardiance to 25 mg as we discussed in office.    His kidney function is decreased and I need to keep a close eye on this.  I would strongly recommend that he increase his hydration.  I we will recheck this in 3 months if it is not improving I will need to send him to the kidney doctors.    As I was concerned his PSA is at 11.6.  I am going to order an MRI of his prostate as we discussed.    Please follow-up in 3 months and let me know about the Rybelsus.  We do have a 30-day sample we can start him on if needed.    Thank you, GEOFFREY

## 2025-06-02 NOTE — TELEPHONE ENCOUNTER
Left VM for the patient to call back for his lab results and I let him know. I will be sending him a [email or web address] message to remind him to call back.

## 2025-06-06 ENCOUNTER — TELEPHONE (OUTPATIENT)
Dept: PRIMARY CARE CLINIC | Facility: CLINIC | Age: 70
End: 2025-06-06

## 2025-06-06 NOTE — TELEPHONE ENCOUNTER
The patients wife returned my phone call and I spoke with her about his lab results and let her know. That his PCP would like to put him know Rybelsus, which is the oral version of Trulicity. And she said that would be fine and I let her know. We were going to increase his Jardiance to 25 mg as well. Also we will be checking his labs at his next ov.

## 2025-06-06 NOTE — TELEPHONE ENCOUNTER
Patient's wife calling for Results.  States was asked to call the office.  Please return call.  Thanks.

## 2025-06-10 DIAGNOSIS — E11.42 TYPE 2 DIABETES MELLITUS WITH DIABETIC POLYNEUROPATHY, WITH LONG-TERM CURRENT USE OF INSULIN (HCC): ICD-10-CM

## 2025-06-10 DIAGNOSIS — Z79.4 TYPE 2 DIABETES MELLITUS WITH DIABETIC POLYNEUROPATHY, WITH LONG-TERM CURRENT USE OF INSULIN (HCC): ICD-10-CM

## 2025-06-10 DIAGNOSIS — E11.65 TYPE 2 DIABETES MELLITUS WITH HYPERGLYCEMIA, WITHOUT LONG-TERM CURRENT USE OF INSULIN (HCC): Primary | ICD-10-CM

## (undated) DEVICE — BETADINE 5% EYE SOL

## (undated) DEVICE — CONSTELLATION VISION SYSTEM 7500 CPM ULTRAVIT PROBE STRAIGHT ENDOILLUMINATOR 25+ TOTALPLUS VITRECTOMY PAK EDGEPLUS BLADE VALVED ENTRY SYSTEM: Brand: CONSTELLATION, ULTRAVIT, 25+, TOTALPLUS, EDGEPLUS

## (undated) DEVICE — MICROSURGICAL INSTRUMENT 25GA SOFT TIP CANNULA, DSP, 0.8MM: Brand: ALCON

## (undated) DEVICE — 25 GA ILLUMINATED FLEXIBLE CURVED LASER PROBE: Brand: ALCON, ENGAUGE

## (undated) DEVICE — 3M™ STERI-DRAPE™ INSTRUMENT POUCH 1018: Brand: STERI-DRAPE™

## (undated) DEVICE — DIATHERMY PROBE DSP, 25G: Brand: ALCON GRIESHABER

## (undated) DEVICE — SOLUTION IRRIGATION BAL SALT SOLUTION 500 ML BTL 6/CA BSS +

## (undated) DEVICE — SURGICAL PROCEDURE PACK EYE CDS

## (undated) DEVICE — SYRINGE MED 5ML STD CLR PLAS LUERLOCK TIP N CTRL DISP

## (undated) DEVICE — PAD,EYE,1-5/8X2 5/8,STERILE,LF,1/PK: Brand: MEDLINE

## (undated) DEVICE — KIT,ANTI FOG,W/SPONGE & FLUID,SOFT PACK: Brand: MEDLINE

## (undated) DEVICE — 3M™ TEGADERM™ TRANSPARENT FILM DRESSING FRAME STYLE, 1626W, 4 IN X 4-3/4 IN (10 CM X 12 CM), 50/CT 4CT/CASE: Brand: 3M™ TEGADERM™

## (undated) DEVICE — DISPOSABLE BIPOLAR CODE, 12' (3.66 M): Brand: CONMED